# Patient Record
Sex: MALE | Race: BLACK OR AFRICAN AMERICAN | Employment: FULL TIME | URBAN - METROPOLITAN AREA
[De-identification: names, ages, dates, MRNs, and addresses within clinical notes are randomized per-mention and may not be internally consistent; named-entity substitution may affect disease eponyms.]

---

## 2021-05-16 ENCOUNTER — HOSPITAL ENCOUNTER (EMERGENCY)
Age: 36
Discharge: HOME | End: 2021-05-16
Attending: EMERGENCY MEDICINE

## 2021-05-16 VITALS
DIASTOLIC BLOOD PRESSURE: 93 MMHG | HEART RATE: 100 BPM | OXYGEN SATURATION: 100 % | WEIGHT: 160 LBS | TEMPERATURE: 98.1 F | SYSTOLIC BLOOD PRESSURE: 113 MMHG | RESPIRATION RATE: 16 BRPM

## 2021-05-16 DIAGNOSIS — S41.111A LACERATION OF RIGHT UPPER EXTREMITY, INITIAL ENCOUNTER: Primary | ICD-10-CM

## 2021-05-16 PROCEDURE — 12002 RPR S/N/AX/GEN/TRNK2.6-7.5CM: CPT

## 2021-05-16 PROCEDURE — 99283 EMERGENCY DEPT VISIT LOW MDM: CPT

## 2021-05-16 ASSESSMENT — PAIN SCALES - GENERAL: PAINLEVEL_OUTOF10: 8

## 2021-05-16 ASSESSMENT — PAIN DESCRIPTION - PAIN TYPE: TYPE: ACUTE PAIN

## 2021-05-16 NOTE — ED NOTES
Wound closure completed per EMD, coban placed per EMD, patient tolerated well     Vick Holt, RN  05/16/21 1344

## 2021-05-16 NOTE — ED NOTES
Reviewed instructions with patient, verb under, discharged home     Kenneth Torres, [facility] [facility]  05/16/21 3387

## 2021-05-16 NOTE — ED PROVIDER NOTES
Clubs or Organizations:     Attends Club or Organization Meetings:     Marital Status:    Intimate Partner Violence:     Fear of Current or Ex-Partner:     Emotionally Abused:     Physically Abused:     Sexually Abused:      No current facility-administered medications for this encounter. No current outpatient medications on file. No Known Allergies    REVIEW OF SYSTEMS  Positives and pertinent negatives as per HPI. Six other systems were reviewed and are negative. Nursing notes pertaining to ROS were reviewed. PHYSICAL EXAM   BP (!) 113/93   Pulse 100   Temp 98.1 °F (36.7 °C) (Oral)   Resp 16   Wt 160 lb (72.6 kg)   SpO2 100%   GENERAL APPEARANCE: Awake and alert. Cooperative. No acute distress. HEAD: Normocephalic. Atraumatic. EYES: PERRL. EOM's grossly intact. No scleral icterus, injection or exudate  ENT: Mucous membranes are moist.    EXTREMITIES: 5 cm linear laceration with clean edges along the volar aspect of the right mid forearm. No palpable or visualizable foreign bodies. No evidence of gross contamination. Normal sensation to light touch of the right hand. Normal wrist flexion extension tendon strength as well as normal flexor and extensor tendon strength at the MCP, PIP and DIP joints of digits 1 through 5. SKIN: Warm and dry. NEUROLOGICAL: Alert and oriented. Lac Repair    Date/Time: 5/16/2021 6:35 AM  Performed by: Citlaly Meneses MD  Authorized by: Citlaly Meneses MD     Consent:     Consent obtained:  Verbal    Consent given by:  Patient    Risks discussed:  Infection, pain, poor cosmetic result, poor wound healing, retained foreign body and need for additional repair    Alternatives discussed:  Observation  Anesthesia (see MAR for exact dosages):      Anesthesia method:  Local infiltration    Local anesthetic:  Lidocaine 1% w/o epi  Laceration details:     Location:  Shoulder/arm    Shoulder/arm location:  R lower arm    Length (cm):  5    Depth (mm):  4  Repair type:     Repair type:  Simple  Pre-procedure details:     Preparation:  Patient was prepped and draped in usual sterile fashion  Exploration:     Hemostasis achieved with:  Direct pressure    Wound exploration: wound explored through full range of motion and entire depth of wound probed and visualized      Contaminated: no    Treatment:     Area cleansed with:  Hibiclens    Amount of cleaning:  Standard    Irrigation solution:  Sterile water    Irrigation volume:  200cc    Irrigation method:  Syringe  Skin repair:     Repair method:  Staples    Number of staples:  6  Approximation:     Approximation:  Close  Post-procedure details:     Dressing:  Sterile dressing    Patient tolerance of procedure: Tolerated well, no immediate complications        ED COURSE/MDM  Right forearm laceration without evidence of foreign body or gross contamination and no evidence of neurovascular or musculotendinous injury. Tetanus status is up-to-date. Wound precautions were given. Staples out in 12 days. Hypertension:  Patient was hypertensive during the ER visit today. There are no signs of hypertensive emergency or evidence of end organ damage by history or physical exam.  These findings were discussed with the patient and advised to follow up with primary care physician to further assess and treat hypertension in the outpatient setting. The patient's blood pressure was found to be elevated according to CMS/Medicare and the Affordable Care Act/ObamaCare criteria. Elevated blood pressure could occur because of pain or anxiety or other reasons and does not mean that they need to have their blood pressure treated or medications otherwise adjusted. However, this could also be a sign that they will need to have their blood pressure treated or medications changed. The patient was instructed to take a list of recent blood pressure readings to their next visit with their personal physician.       Patient was

## 2021-06-08 ENCOUNTER — HOSPITAL ENCOUNTER (EMERGENCY)
Age: 36
Discharge: HOME | End: 2021-06-08

## 2021-06-08 VITALS
HEIGHT: 74 IN | WEIGHT: 161.38 LBS | TEMPERATURE: 97.7 F | RESPIRATION RATE: 12 BRPM | DIASTOLIC BLOOD PRESSURE: 65 MMHG | BODY MASS INDEX: 20.71 KG/M2 | OXYGEN SATURATION: 99 % | HEART RATE: 71 BPM | SYSTOLIC BLOOD PRESSURE: 111 MMHG

## 2021-06-08 DIAGNOSIS — Z48.02 ENCOUNTER FOR STAPLE REMOVAL: Primary | ICD-10-CM

## 2021-06-08 PROCEDURE — 99283 EMERGENCY DEPT VISIT LOW MDM: CPT

## 2021-06-08 NOTE — ED PROVIDER NOTES
**ADVANCED PRACTICE PROVIDER, I HAVE EVALUATED THIS PATIENT**        [address] ENCOUNTER      Pt Name: Jamey Person  EUV:[id number]  [place] 1985  Date of evaluation: 6/8/2021  Provider: Remo Bernheim, PA-C      Chief Complaint:    Chief Complaint   Patient presents with    Suture / Staple Removal     placed to R forearm on 5/20; staples         Nursing Notes, Past Medical Hx, Past Surgical Hx, Social Hx, Allergies, and Family Hx were all reviewed and agreed with or any disagreements were addressed in the HPI.    HPI: (Location, Duration, Timing, Severity, Quality, Assoc Sx, Context, Modifying factors)  This is a  28 y.o. male who presents with a Chief Complaint of needing staples removed from the right forearm. Patient had a laceration that was accidental that occurred about 2 weeks ago and had staples placed in the right forearm. It is time for them to come out. No increased heat or redness or pain. Patient states that it is a little bit. No other acute complaints or changes. PastMedical/Surgical History:  No previous surgical repair to this area    Medications:  Previous Medications    No medications on file   No medications used for this      Review of Systems:  (2-9 systems needed)  Review of Systems  Positive history as above with no additional injury including no fall or contusion or twisting injury. No increased heat or redness bruising or tenderness. No oozing or seeping. No fevers or chills cough or congestion. Physical Exam:  Physical Exam  Vitals and nursing note reviewed. Constitutional:       Appearance: Normal appearance. He is not diaphoretic. HENT:      Head: Normocephalic and atraumatic. Right Ear: External ear normal.      Left Ear: External ear normal.      Nose: Nose normal.   Eyes:      General:         Right eye: No discharge. Left eye: No discharge.       Conjunctiva/sclera: Conjunctivae normal. Dressing applied    Patient tolerance of procedure: Tolerated well, no immediate complications        None    Patient was given:  Medications - No data to display    This patient presents as above and received excellent care for the well-healed laceration on the right forearm. Conservative home care now recommended the patient verbalizes understanding and agreement with the above and the following discharge home plan. The patient tolerated their visit well. I evaluated the patient. The physician was available for consultation as needed. The patient and / or the family were informed of the results of any tests, a time was given to answer questions, a plan was proposed and they agreed with plan. Home to keep the area clean dry and covered for the next day or 2 and monitor for gradual improvement. Follow-up outpatient with family doctor for routine medical needs. Return to the ER for any emergency worsen or concern. CLINICAL IMPRESSION:  1.  Encounter for staple removal        DISPOSITION Decision To Discharge 06/08/2021 03:20:53 PM      PATIENT REFERRED TO:  [facility]) Pre-Services  [phone]          DISCHARGE MEDICATIONS:  New Prescriptions    No medications on file       DISCONTINUED MEDICATIONS:  Discontinued Medications    No medications on file              (Please note the MDM and HPI sections of this note were completed with a voice recognition program.  Efforts were made to edit the dictations but occasionally words are mis-transcribed.)    Electronically signed, John Sahu PA-C,          John Sahu PA-C  06/08/21 8018